# Patient Record
Sex: FEMALE | Race: BLACK OR AFRICAN AMERICAN | NOT HISPANIC OR LATINO | URBAN - METROPOLITAN AREA
[De-identification: names, ages, dates, MRNs, and addresses within clinical notes are randomized per-mention and may not be internally consistent; named-entity substitution may affect disease eponyms.]

---

## 2017-03-06 ENCOUNTER — OFFICE (OUTPATIENT)
Dept: URBAN - METROPOLITAN AREA CLINIC 11 | Facility: CLINIC | Age: 27
End: 2017-03-06

## 2017-03-06 VITALS
SYSTOLIC BLOOD PRESSURE: 115 MMHG | WEIGHT: 118 LBS | DIASTOLIC BLOOD PRESSURE: 80 MMHG | HEIGHT: 64 IN | HEART RATE: 83 BPM

## 2017-03-06 DIAGNOSIS — R10.13 EPIGASTRIC PAIN: ICD-10-CM

## 2017-03-06 PROCEDURE — 99243 OFF/OP CNSLTJ NEW/EST LOW 30: CPT | Performed by: INTERNAL MEDICINE

## 2017-03-06 RX ORDER — RANITIDINE 150 MG/1
300 TABLET ORAL
Qty: 60 | Status: ACTIVE
Start: 2017-03-06

## 2017-03-06 RX ORDER — HYOSCYAMINE SULFATE 0.12 MG/1
0.38 TABLET ORAL; SUBLINGUAL
Qty: 90 | Status: ACTIVE
Start: 2017-03-06

## 2017-03-06 NOTE — SERVICEHPINOTES
This is a   26   year old  female   seen   in consultation at the request of Dr. STIVEN KRUGER  . Has noted epigastric pain dull pain intermittent 3yrs ago, in [place]  some burning no allevatiating or worsening factors, went to PCP told to avoid fast food,  noted bloody diarrhea during first episode.  Has tried medication in [facility] that helped, name not known, treated for H pylori, told 6 months test negative.  Had been taking Advil two three/times week, told to stop by doctors in [facility], last dose June 2016.  Had EGD at onset of symptoms at the age of 24.   Also had Xray told negative.  Can be worse with bitter or oil or potates with tomatoes.  Admits to GERD not any meds. Told not take any meds by PCP since liver enzymes, August 2016.  Told liver enzymes elevated at last visit to ER in [place]. Had been taking herbral meds for her hair.  Last medication pain killer from her dad. Urine and stool  normal color.  Taking Fe told her blood test was not normal.    Recently has assoc nausea or vomiting old food every 3 weeks.  Pain can last 30minutes up to 90 minutes has gone to [place] ER, last visit January, told to see a specialist.  wt loss 8 lbs. over the last year.

## 2017-03-16 LAB
COMPREHENSIVE METABOLIC PANEL: ALBUMIN/GLOBULIN RATIO: 1.4 (CALC) (ref 1–2.5)
COMPREHENSIVE METABOLIC PANEL: ALBUMIN: 4.1 G/DL (ref 3.6–5.1)
COMPREHENSIVE METABOLIC PANEL: ALKALINE PHOSPHATASE: 172 U/L — HIGH (ref 33–115)
COMPREHENSIVE METABOLIC PANEL: ALT: 68 U/L — HIGH (ref 6–29)
COMPREHENSIVE METABOLIC PANEL: AST: 49 U/L — HIGH (ref 10–30)
COMPREHENSIVE METABOLIC PANEL: BILIRUBIN, TOTAL: 0.4 MG/DL (ref 0.2–1.2)
COMPREHENSIVE METABOLIC PANEL: BUN/CREATININE RATIO: (no result) (CALC)
COMPREHENSIVE METABOLIC PANEL: CALCIUM: 9.4 MG/DL (ref 8.6–10.2)
COMPREHENSIVE METABOLIC PANEL: CARBON DIOXIDE: 27 MMOL/L (ref 20–31)
COMPREHENSIVE METABOLIC PANEL: CHLORIDE: 103 MMOL/L (ref 98–110)
COMPREHENSIVE METABOLIC PANEL: CREATININE: 0.72 MG/DL (ref 0.5–1.1)
COMPREHENSIVE METABOLIC PANEL: EGFR AFRICAN AMERICAN: 135 ML/MIN/1.73M2 (ref 60–?)
COMPREHENSIVE METABOLIC PANEL: EGFR NON-AFR. AMERICAN: 116 ML/MIN/1.73M2 (ref 60–?)
COMPREHENSIVE METABOLIC PANEL: GLOBULIN: 3 G/DL (CALC) (ref 1.9–3.7)
COMPREHENSIVE METABOLIC PANEL: GLUCOSE: 78 MG/DL (ref 65–99)
COMPREHENSIVE METABOLIC PANEL: POTASSIUM: 5.2 MMOL/L (ref 3.5–5.3)
COMPREHENSIVE METABOLIC PANEL: PROTEIN, TOTAL: 7.1 G/DL (ref 6.1–8.1)
COMPREHENSIVE METABOLIC PANEL: SODIUM: 138 MMOL/L (ref 135–146)
COMPREHENSIVE METABOLIC PANEL: UREA NITROGEN (BUN): 7 MG/DL (ref 7–25)

## 2017-07-10 ENCOUNTER — OFFICE (OUTPATIENT)
Dept: URBAN - METROPOLITAN AREA CLINIC 11 | Facility: CLINIC | Age: 27
End: 2017-07-10

## 2017-07-10 VITALS
HEIGHT: 64 IN | WEIGHT: 110 LBS | HEART RATE: 69 BPM | DIASTOLIC BLOOD PRESSURE: 71 MMHG | SYSTOLIC BLOOD PRESSURE: 107 MMHG

## 2017-07-10 DIAGNOSIS — R10.13 EPIGASTRIC PAIN: ICD-10-CM

## 2017-07-10 DIAGNOSIS — R63.4 WEIGHT LOSS: ICD-10-CM

## 2017-07-10 PROCEDURE — 99213 OFFICE O/P EST LOW 20 MIN: CPT | Performed by: INTERNAL MEDICINE

## 2017-07-10 NOTE — SERVICEHPINOTES
CATHI GONZALEZ   returns today for follow-up from last visit on   3/6/2017  .    Patient with h/o abdominal pain none for 6 weeks in April and May, pain returned June and July with nausea and vomiting.  Composition/Volume/Schedule/Recommend start NGT feeding Jevity 1.2 @ 30ml/hr to goal rate 50ml/kp=4478ol, 1440kcal & 67gm protein/Concentration/Rate

## 2017-07-13 ENCOUNTER — AMBULATORY SURGICAL CENTER (OUTPATIENT)
Dept: URBAN - METROPOLITAN AREA SURGERY 8 | Facility: SURGERY | Age: 27
End: 2017-07-13

## 2017-07-13 VITALS
DIASTOLIC BLOOD PRESSURE: 80 MMHG | TEMPERATURE: 97.5 F | WEIGHT: 110 LBS | HEART RATE: 66 BPM | HEIGHT: 64 IN | SYSTOLIC BLOOD PRESSURE: 123 MMHG | RESPIRATION RATE: 12 BRPM | OXYGEN SATURATION: 100 %

## 2017-07-13 DIAGNOSIS — R10.13 EPIGASTRIC PAIN: ICD-10-CM

## 2017-07-13 DIAGNOSIS — K29.70 GASTRITIS, UNSPECIFIED, WITHOUT BLEEDING: ICD-10-CM

## 2017-07-13 LAB — SURGICAL: PDF REPORT: (no result)

## 2017-07-13 PROCEDURE — 99152 MOD SED SAME PHYS/QHP 5/>YRS: CPT | Performed by: INTERNAL MEDICINE

## 2017-07-13 PROCEDURE — 43239 EGD BIOPSY SINGLE/MULTIPLE: CPT | Performed by: INTERNAL MEDICINE

## 2017-07-13 NOTE — SERVICEHPINOTES
CATHI GONZALEZ returns today for follow-up from last visit on 3/6/2017. Patient with h/o abdominal pain none for 6 weeks in April and May, pain returned June and July with nausea and vomiting.

## 2017-08-18 ENCOUNTER — OFFICE (OUTPATIENT)
Dept: URBAN - METROPOLITAN AREA CLINIC 11 | Facility: CLINIC | Age: 27
End: 2017-08-18

## 2017-08-18 VITALS
HEART RATE: 92 BPM | WEIGHT: 110 LBS | SYSTOLIC BLOOD PRESSURE: 116 MMHG | HEIGHT: 64 IN | DIASTOLIC BLOOD PRESSURE: 74 MMHG

## 2017-08-18 DIAGNOSIS — R10.13 EPIGASTRIC PAIN: ICD-10-CM

## 2017-08-18 PROCEDURE — 99214 OFFICE O/P EST MOD 30 MIN: CPT | Performed by: INTERNAL MEDICINE

## 2017-08-18 RX ORDER — HYOSCYAMINE SULFATE 0.12 MG/1
0.38 TABLET ORAL; SUBLINGUAL
Qty: 90 | Status: ACTIVE
Start: 2017-08-18

## 2017-08-18 NOTE — SERVICEHPINOTES
CATHI GONZALEZ   returns today for follow-up from last visit on   7/13/2017  .    Did well until two weeks, noted mid epigastric 630 two hrs after dinner with vomiting.  Denies heartburn.  Had MRCP showing normal CBD and mild intrahepatic duct dilation and mild periportal lymphadenopathy.

## 2019-05-21 ENCOUNTER — WALK IN (OUTPATIENT)
Dept: URGENT CARE | Age: 29
End: 2019-05-21

## 2019-05-21 DIAGNOSIS — H60.502 ACUTE OTITIS EXTERNA OF LEFT EAR, UNSPECIFIED TYPE: Primary | ICD-10-CM

## 2019-05-21 DIAGNOSIS — R50.9 FEBRILE ILLNESS: ICD-10-CM

## 2019-05-21 PROCEDURE — 99202 OFFICE O/P NEW SF 15 MIN: CPT | Performed by: FAMILY MEDICINE

## 2019-05-21 RX ORDER — CEFUROXIME AXETIL 250 MG/1
250 TABLET ORAL 2 TIMES DAILY
Qty: 14 TABLET | Refills: 0 | Status: SHIPPED | OUTPATIENT
Start: 2019-05-21 | End: 2019-05-28

## 2019-05-21 RX ORDER — NEOMYCIN SULFATE, POLYMYXIN B SULFATE AND HYDROCORTISONE 10; 3.5; 1 MG/ML; MG/ML; [USP'U]/ML
4 SUSPENSION/ DROPS AURICULAR (OTIC) 3 TIMES DAILY
Qty: 10 ML | Refills: 0 | Status: SHIPPED | OUTPATIENT
Start: 2019-05-21

## 2019-05-21 SDOH — HEALTH STABILITY: MENTAL HEALTH: HOW OFTEN DO YOU HAVE A DRINK CONTAINING ALCOHOL?: NEVER

## 2019-05-21 ASSESSMENT — PAIN SCALES - GENERAL: PAINLEVEL: 9-10

## 2019-05-25 ENCOUNTER — WALK IN (OUTPATIENT)
Dept: URGENT CARE | Age: 29
End: 2019-05-25

## 2019-05-25 DIAGNOSIS — H60.501 ACUTE OTITIS EXTERNA OF RIGHT EAR, UNSPECIFIED TYPE: Primary | ICD-10-CM

## 2019-05-25 PROCEDURE — 99213 OFFICE O/P EST LOW 20 MIN: CPT | Performed by: FAMILY MEDICINE

## 2019-05-25 RX ORDER — NEOMYCIN SULFATE, POLYMYXIN B SULFATE AND HYDROCORTISONE 10; 3.5; 1 MG/ML; MG/ML; [USP'U]/ML
3 SUSPENSION/ DROPS AURICULAR (OTIC) 4 TIMES DAILY
Qty: 10 ML | Refills: 0 | Status: SHIPPED | OUTPATIENT
Start: 2019-05-25

## 2019-05-25 SDOH — HEALTH STABILITY: MENTAL HEALTH: HOW OFTEN DO YOU HAVE A DRINK CONTAINING ALCOHOL?: NEVER

## 2019-05-25 ASSESSMENT — PAIN SCALES - GENERAL: PAINLEVEL: 9-10

## 2021-05-26 VITALS
DIASTOLIC BLOOD PRESSURE: 86 MMHG | WEIGHT: 137 LBS | WEIGHT: 133 LBS | OXYGEN SATURATION: 99 % | OXYGEN SATURATION: 97 % | SYSTOLIC BLOOD PRESSURE: 110 MMHG | TEMPERATURE: 99.4 F | HEIGHT: 64 IN | DIASTOLIC BLOOD PRESSURE: 70 MMHG | BODY MASS INDEX: 23.39 KG/M2 | BODY MASS INDEX: 22.16 KG/M2 | HEIGHT: 65 IN | RESPIRATION RATE: 14 BRPM | SYSTOLIC BLOOD PRESSURE: 122 MMHG | TEMPERATURE: 102.3 F | RESPIRATION RATE: 16 BRPM | HEART RATE: 85 BPM | HEART RATE: 84 BPM